# Patient Record
Sex: FEMALE | Race: WHITE | Employment: FULL TIME | URBAN - METROPOLITAN AREA
[De-identification: names, ages, dates, MRNs, and addresses within clinical notes are randomized per-mention and may not be internally consistent; named-entity substitution may affect disease eponyms.]

---

## 2017-06-12 RX ORDER — ALLOPURINOL 300 MG/1
TABLET ORAL
Qty: 90 TABLET | Refills: 0 | Status: SHIPPED | OUTPATIENT
Start: 2017-06-12 | End: 2017-09-14 | Stop reason: SDUPTHER

## 2017-06-16 RX ORDER — MELOXICAM 15 MG/1
15 TABLET ORAL DAILY
Qty: 90 TABLET | Refills: 1 | Status: SHIPPED | OUTPATIENT
Start: 2017-06-16 | End: 2017-06-19 | Stop reason: SDUPTHER

## 2017-06-19 RX ORDER — MELOXICAM 15 MG/1
15 TABLET ORAL DAILY
Qty: 90 TABLET | Refills: 3 | Status: SHIPPED | OUTPATIENT
Start: 2017-06-19 | End: 2017-09-14 | Stop reason: SDUPTHER

## 2017-09-14 ENCOUNTER — OFFICE VISIT (OUTPATIENT)
Dept: FAMILY MEDICINE CLINIC | Age: 62
End: 2017-09-14
Payer: COMMERCIAL

## 2017-09-14 VITALS
DIASTOLIC BLOOD PRESSURE: 80 MMHG | BODY MASS INDEX: 35.63 KG/M2 | SYSTOLIC BLOOD PRESSURE: 116 MMHG | HEIGHT: 67 IN | OXYGEN SATURATION: 97 % | HEART RATE: 72 BPM | WEIGHT: 227 LBS

## 2017-09-14 DIAGNOSIS — M10.9 GOUT, UNSPECIFIED CAUSE, UNSPECIFIED CHRONICITY, UNSPECIFIED SITE: Primary | ICD-10-CM

## 2017-09-14 DIAGNOSIS — Z23 IMMUNIZATION DUE: ICD-10-CM

## 2017-09-14 DIAGNOSIS — Z12.11 SCREEN FOR COLON CANCER: ICD-10-CM

## 2017-09-14 DIAGNOSIS — R60.9 EDEMA, UNSPECIFIED TYPE: ICD-10-CM

## 2017-09-14 PROCEDURE — G8427 DOCREV CUR MEDS BY ELIG CLIN: HCPCS | Performed by: FAMILY MEDICINE

## 2017-09-14 PROCEDURE — 90686 IIV4 VACC NO PRSV 0.5 ML IM: CPT | Performed by: FAMILY MEDICINE

## 2017-09-14 PROCEDURE — 3014F SCREEN MAMMO DOC REV: CPT | Performed by: FAMILY MEDICINE

## 2017-09-14 PROCEDURE — G8417 CALC BMI ABV UP PARAM F/U: HCPCS | Performed by: FAMILY MEDICINE

## 2017-09-14 PROCEDURE — 99213 OFFICE O/P EST LOW 20 MIN: CPT | Performed by: FAMILY MEDICINE

## 2017-09-14 PROCEDURE — 3017F COLORECTAL CA SCREEN DOC REV: CPT | Performed by: FAMILY MEDICINE

## 2017-09-14 PROCEDURE — 1036F TOBACCO NON-USER: CPT | Performed by: FAMILY MEDICINE

## 2017-09-14 PROCEDURE — 90471 IMMUNIZATION ADMIN: CPT | Performed by: FAMILY MEDICINE

## 2017-09-14 RX ORDER — MELOXICAM 15 MG/1
15 TABLET ORAL DAILY
Qty: 90 TABLET | Refills: 3 | Status: SHIPPED | OUTPATIENT
Start: 2017-09-14 | End: 2018-10-10 | Stop reason: SDUPTHER

## 2017-09-14 RX ORDER — ALLOPURINOL 300 MG/1
300 TABLET ORAL DAILY
Qty: 90 TABLET | Refills: 3 | Status: SHIPPED | OUTPATIENT
Start: 2017-09-14

## 2017-09-14 RX ORDER — ROSUVASTATIN CALCIUM 20 MG/1
20 TABLET, COATED ORAL DAILY
Qty: 90 TABLET | Refills: 3 | Status: SHIPPED | OUTPATIENT
Start: 2017-09-14 | End: 2017-10-09 | Stop reason: SDUPTHER

## 2017-09-14 RX ORDER — TRIAMTERENE AND HYDROCHLOROTHIAZIDE 75; 50 MG/1; MG/1
1 TABLET ORAL DAILY
Qty: 90 TABLET | Refills: 3 | Status: SHIPPED | OUTPATIENT
Start: 2017-09-14 | End: 2020-10-30

## 2017-09-14 ASSESSMENT — ENCOUNTER SYMPTOMS
ABDOMINAL DISTENTION: 0
COUGH: 0
EYE DISCHARGE: 0
SORE THROAT: 0
NAUSEA: 0
CONSTIPATION: 0
SHORTNESS OF BREATH: 0
EYE REDNESS: 0
WHEEZING: 0
EYE ITCHING: 0
SINUS PRESSURE: 0
PHOTOPHOBIA: 0
EYE PAIN: 0
BACK PAIN: 0
RHINORRHEA: 0
ABDOMINAL PAIN: 0
VOMITING: 0
CHEST TIGHTNESS: 0
BLOOD IN STOOL: 0
DIARRHEA: 0
COLOR CHANGE: 0
FACIAL SWELLING: 0
VOICE CHANGE: 0

## 2017-09-14 ASSESSMENT — PATIENT HEALTH QUESTIONNAIRE - PHQ9
SUM OF ALL RESPONSES TO PHQ9 QUESTIONS 1 & 2: 2
1. LITTLE INTEREST OR PLEASURE IN DOING THINGS: 1
2. FEELING DOWN, DEPRESSED OR HOPELESS: 1
SUM OF ALL RESPONSES TO PHQ QUESTIONS 1-9: 2

## 2017-10-09 RX ORDER — ROSUVASTATIN CALCIUM 20 MG/1
20 TABLET, COATED ORAL DAILY
Qty: 90 TABLET | Refills: 3 | Status: SHIPPED | OUTPATIENT
Start: 2017-10-09

## 2017-10-09 RX ORDER — POTASSIUM CHLORIDE 20 MEQ/1
40 TABLET, EXTENDED RELEASE ORAL DAILY
Qty: 180 TABLET | Refills: 3 | Status: SHIPPED | OUTPATIENT
Start: 2017-10-09

## 2018-10-10 RX ORDER — MELOXICAM 15 MG/1
TABLET ORAL
Qty: 90 TABLET | Refills: 3 | Status: SHIPPED | OUTPATIENT
Start: 2018-10-10

## 2018-11-30 DIAGNOSIS — M25.512 BILATERAL SHOULDER PAIN, UNSPECIFIED CHRONICITY: ICD-10-CM

## 2018-11-30 DIAGNOSIS — M25.511 BILATERAL SHOULDER PAIN, UNSPECIFIED CHRONICITY: ICD-10-CM

## 2018-12-03 ENCOUNTER — HOSPITAL ENCOUNTER (OUTPATIENT)
Dept: GENERAL RADIOLOGY | Facility: CLINIC | Age: 63
Discharge: HOME | End: 2018-12-05
Payer: COMMERCIAL

## 2018-12-03 ENCOUNTER — OFFICE VISIT (OUTPATIENT)
Dept: ORTHOPEDIC SURGERY | Age: 63
End: 2018-12-03
Payer: COMMERCIAL

## 2018-12-03 VITALS
BODY MASS INDEX: 34.53 KG/M2 | DIASTOLIC BLOOD PRESSURE: 92 MMHG | HEART RATE: 72 BPM | SYSTOLIC BLOOD PRESSURE: 151 MMHG | WEIGHT: 220 LBS | HEIGHT: 67 IN

## 2018-12-03 DIAGNOSIS — M25.512 BILATERAL SHOULDER PAIN, UNSPECIFIED CHRONICITY: ICD-10-CM

## 2018-12-03 DIAGNOSIS — Z96.611 HISTORY OF TOTAL REPLACEMENT OF BOTH SHOULDER JOINTS: Primary | ICD-10-CM

## 2018-12-03 DIAGNOSIS — Z96.612 HISTORY OF TOTAL REPLACEMENT OF BOTH SHOULDER JOINTS: Primary | ICD-10-CM

## 2018-12-03 DIAGNOSIS — M25.511 BILATERAL SHOULDER PAIN, UNSPECIFIED CHRONICITY: ICD-10-CM

## 2018-12-03 PROCEDURE — G8484 FLU IMMUNIZE NO ADMIN: HCPCS | Performed by: ORTHOPAEDIC SURGERY

## 2018-12-03 PROCEDURE — G8427 DOCREV CUR MEDS BY ELIG CLIN: HCPCS | Performed by: ORTHOPAEDIC SURGERY

## 2018-12-03 PROCEDURE — 73030 X-RAY EXAM OF SHOULDER: CPT

## 2018-12-03 PROCEDURE — 99213 OFFICE O/P EST LOW 20 MIN: CPT | Performed by: ORTHOPAEDIC SURGERY

## 2018-12-03 PROCEDURE — G8417 CALC BMI ABV UP PARAM F/U: HCPCS | Performed by: ORTHOPAEDIC SURGERY

## 2018-12-03 PROCEDURE — 1036F TOBACCO NON-USER: CPT | Performed by: ORTHOPAEDIC SURGERY

## 2018-12-03 PROCEDURE — 3017F COLORECTAL CA SCREEN DOC REV: CPT | Performed by: ORTHOPAEDIC SURGERY

## 2018-12-03 RX ORDER — CYCLOBENZAPRINE HCL 5 MG
5 TABLET ORAL PRN
COMMUNITY
Start: 2018-10-22

## 2018-12-03 RX ORDER — HYDROCHLOROTHIAZIDE 25 MG/1
25 TABLET ORAL
COMMUNITY
Start: 2018-10-22

## 2018-12-03 ASSESSMENT — ENCOUNTER SYMPTOMS
RESPIRATORY NEGATIVE: 1
EYES NEGATIVE: 1
GASTROINTESTINAL NEGATIVE: 1

## 2019-05-14 ENCOUNTER — OFFICE VISIT (OUTPATIENT)
Dept: ORTHOPEDIC SURGERY | Age: 64
End: 2019-05-14
Payer: COMMERCIAL

## 2019-05-14 VITALS
WEIGHT: 225 LBS | HEART RATE: 78 BPM | DIASTOLIC BLOOD PRESSURE: 83 MMHG | SYSTOLIC BLOOD PRESSURE: 144 MMHG | HEIGHT: 67 IN | BODY MASS INDEX: 35.31 KG/M2

## 2019-05-14 DIAGNOSIS — Z96.611 H/O TOTAL SHOULDER REPLACEMENT, RIGHT: ICD-10-CM

## 2019-05-14 DIAGNOSIS — S46.811A STRAIN OF RIGHT DELTOID MUSCLE, INITIAL ENCOUNTER: Primary | ICD-10-CM

## 2019-05-14 PROCEDURE — 99213 OFFICE O/P EST LOW 20 MIN: CPT | Performed by: PHYSICIAN ASSISTANT

## 2019-05-14 ASSESSMENT — ENCOUNTER SYMPTOMS
NAUSEA: 0
COUGH: 0
DIARRHEA: 0
APNEA: 0
ABDOMINAL PAIN: 0
ABDOMINAL DISTENTION: 0
VOMITING: 0
CONSTIPATION: 0
CHEST TIGHTNESS: 0
SHORTNESS OF BREATH: 0
COLOR CHANGE: 0

## 2019-05-14 NOTE — PROGRESS NOTES
problem, joint swelling and myalgias. Skin: Negative for color change and rash. Neurological: Negative for dizziness, weakness, numbness and headaches. Psychiatric/Behavioral: Negative for sleep disturbance.      Past Medical History:    Past Medical History:   Diagnosis Date    DVT, lower extremity ([address] more than 20 years ago    HPV (human papilloma virus) anogenital infection     Hyperlipidemia     Neuropathic pain of foot     dale feet    Osteoarthritis     PONV (postoperative nausea and vomiting)     Varicose vein of leg      Past Surgical History:    Past Surgical History:   Procedure Laterality Date    BACK SURGERY      BREAST BIOPSY Right     BREAST BIOPSY Right     has clip in    BUNIONECTOMY Bilateral     COLONOSCOPY N/A 6/20/14    DILATION AND CURETTAGE OF UTERUS      JOINT REPLACEMENT Bilateral 2011 and 2013    shoulders    LAMINECTOMY      l4-l5    OTHER SURGICAL HISTORY      laser of cervix and genital warts for HPV virus    SHOULDER SURGERY Bilateral     TONSILLECTOMY      TOTAL KNEE ARTHROPLASTY Bilateral 06/29/2016     Current Medications:   Current Outpatient Medications   Medication Sig Dispense Refill    cyclobenzaprine (FLEXERIL) 5 MG tablet Take 5 mg by mouth      hydrochlorothiazide (HYDRODIURIL) 25 MG tablet Take 25 mg by mouth      meloxicam (MOBIC) 15 MG tablet TAKE ONE TABLET BY MOUTH ONCE DAILY 90 tablet 3    rosuvastatin (CRESTOR) 20 MG tablet Take 1 tablet by mouth daily 90 tablet 3    potassium chloride (KLOR-CON M) 20 MEQ extended release tablet Take 2 tablets by mouth daily 180 tablet 3    allopurinol (ZYLOPRIM) 300 MG tablet Take 1 tablet by mouth daily 90 tablet 3    triamterene-hydrochlorothiazide (MAXZIDE) 75-50 MG per tablet Take 1 tablet by mouth daily 90 tablet 3    oxyCODONE-acetaminophen (PERCOCET) 5-325 MG per tablet 1- 2 q 8 hours prn pain 60 tablet 0    triamterene-hydrochlorothiazide (MAXZIDE-25) 37.5-25 MG per tablet Take 0.5 tablets by mouth daily 30 tablet 0    docusate sodium (COLACE, DULCOLAX) 100 MG CAPS Take 100 mg by mouth 2 times daily 60 capsule 0    DULoxetine (CYMBALTA) 60 MG capsule Take 1 capsule by mouth daily 90 capsule 3    Calcium-Magnesium-Vitamin D (CALCIUM 1200+D3 PO) Take 1,200 mg by mouth daily. No current facility-administered medications for this visit. Allergies:    Dicloxacillin and Pcn [penicillins]    Social History:   Social History     Socioeconomic History    Marital status:       Spouse name: None    Number of children: 2    Years of education: None    Highest education level: None   Occupational History     Employer: [employer]   Social Needs    Financial resource strain: None    Food insecurity:     Worry: None     Inability: None    Transportation needs:     Medical: None     Non-medical: None   Tobacco Use    Smoking status: Former Smoker     Years: 30.00     Last attempt to quit: 1999     Years since quittin.8    Smokeless tobacco: Never Used   Substance and Sexual Activity    Alcohol use: No    Drug use: No    Sexual activity: None   Lifestyle    Physical activity:     Days per week: None     Minutes per session: None    Stress: None   Relationships    Social connections:     Talks on phone: None     Gets together: None     Attends Muslim service: None     Active member of club or organization: None     Attends meetings of clubs or organizations: None     Relationship status: None    Intimate partner violence:     Fear of current or ex partner: None     Emotionally abused: None     Physically abused: None     Forced sexual activity: None   Other Topics Concern    None   Social History Narrative    None     Family History:  Family History   Problem Relation Age of Onset    No Known Problems Mother     No Known Problems Father      I have reviewed the CC, HPI, ROS, PMH, FHX, Social History, and if not present in this note, I have reviewed in the patient's chart. I agree with the documentation provided by other staff and have reviewed their documentation prior to providing my signature indicating agreement. Vitals:   BP (!) 144/83   Pulse 78   Ht 5' 6.5\" (1.689 m)   Wt 225 lb (102.1 kg)   BMI 35.77 kg/m²  Body mass index is 35.77 kg/m². Physical Examination:     Orthopedics:    GENERAL: Alert and oriented X3 in no acute distress. SKIN: Intact without lesions or ulcerations. NEURO: Musculoskeletal and axillary nerves intact to sensory and motor testing. VASC: Capillary refill is less than 3 seconds. Right Shoulder Exam    GEN:  Alert and oriented X 3, in no acute distress. SKIN:  Intact without rashes, lesions, or ulcerations. Incisions are well healed. NEURO:  Musculoskeletal ans axillary nerves intact to sensory and motor testing. VASC:  Cap refill less than than 3 secs. Negative Adson's test, Negative Narcisa's test.  ROM: 140 degrees of forward elevation, 40 degrees of external rotation in neutral, 90 degrees of external rotation in abduction, internal rotation to T10. STRENGTH: Supraspinatus 4+/5, external rotators 5/5. MUSC: No atrophy, negative subscap lift off or belly press test.  IMP: No painful arc, No pain with cross body abduction. PALP: No pain over anterolateral acromion, No pain over AC joint, No pain over traps/rhomboids. Anterior shoulder pain to palpation. INST: Mild Speed's test    Assessment:     1. Strain of right deltoid muscle, initial encounter    2. H/O total shoulder replacement, right        Procedures:    Procedure: no    Radiology:   X-ray from 12/03/18 was reviewed.    --------------------------------------------------  Xr Shoulder Right (min 2 Views)    Result Date: 5/15/2019  TOTAL SHOULDER X-RAY 2 views of the right shoulder and 2 views of the scapula, include AP, scapular Y, outlet and axillary views reveal a Depuy total shoulder arthroplasty in proper position with no subsidence, loosening or osteolysis present. Acromion is a type II. The acromioclavicular joint shows mild degenerative changes. Impression: right stable total shoulder arthroplasty with no complicating features. Plan:   Treatment : I reviewed the X-ray with the patient and I informed them that the shoulder looks good and there are no evident issues with the shoulder prosthetic. We discussed the etiologies and natural histories of s/p Right TSA. We discussed the various treatment alternatives including anti-inflammatory medications, physical therapy, injections, further imaging studies and as a last result surgery. During today's visit, I explained to the patient that I feel it would be advantageous for her to attend physical therapy to try and alleviate her shoulder pain because her ROM was good and so was her strength. In addition, I do not feel she is doing any damage using the shoulder so I instructed her not to worry and she may continue doing her ADL's with the shoulder. At this time, the patient has opted for a physical therapy script. A physical therapy prescription was given. Patient should return to the clinic in 6 weeks to follow up with Andreina Castorena PA-C, ATC if she is not better. The patient will call the office immediately with any problems. No orders of the defined types were placed in this encounter.     Orders Placed This Encounter   Procedures    XR SHOULDER RIGHT (MIN 2 VIEWS)     Standing Status:   Future     Number of Occurrences:   1     Standing Expiration Date:   5/14/2020    External Referral To Physical Therapy     Referral Priority:   Routine     Referral Type:   Eval and Treat     Referral Reason:   Specialty Services Required     Requested Specialty:   Physical Therapy     Number of Visits Requested:   1     Alex BULLOCK am scribing for and in the presence of  Talia Sainz ATC. 5/15/2019  10:23 PM    Andreina BULLOCK PA-C, ATC,  have personally seen this patient, reviewed the chart including history, and imaging if done. I personally  performed the physical exam and obtained any needed additional history. I placed orders, performed or supervised procedures and developed the treatment plan.     Electronically signed by Alva Smith PA-C, on 5/15/2019 at 10:24 PM      Electronically signed by Alva Smith PA-C, on 5/15/2019 at 10:23 PM

## 2019-05-31 ENCOUNTER — OFFICE VISIT (OUTPATIENT)
Dept: ORTHOPEDIC SURGERY | Age: 64
End: 2019-05-31
Payer: COMMERCIAL

## 2019-05-31 VITALS — WEIGHT: 225.09 LBS | BODY MASS INDEX: 36.17 KG/M2 | HEIGHT: 66 IN

## 2019-05-31 DIAGNOSIS — S92.351K CLOSED DISPLACED FRACTURE OF FIFTH METATARSAL BONE OF RIGHT FOOT WITH NONUNION, SUBSEQUENT ENCOUNTER: ICD-10-CM

## 2019-05-31 DIAGNOSIS — M21.861 GASTROCNEMIUS EQUINUS OF RIGHT LOWER EXTREMITY: ICD-10-CM

## 2019-05-31 DIAGNOSIS — M21.6X9 CAVUS DEFORMITY OF FOOT, ACQUIRED: Primary | ICD-10-CM

## 2019-05-31 PROCEDURE — G8427 DOCREV CUR MEDS BY ELIG CLIN: HCPCS | Performed by: ORTHOPAEDIC SURGERY

## 2019-05-31 PROCEDURE — 99203 OFFICE O/P NEW LOW 30 MIN: CPT | Performed by: ORTHOPAEDIC SURGERY

## 2019-05-31 PROCEDURE — 3017F COLORECTAL CA SCREEN DOC REV: CPT | Performed by: ORTHOPAEDIC SURGERY

## 2019-05-31 PROCEDURE — G8417 CALC BMI ABV UP PARAM F/U: HCPCS | Performed by: ORTHOPAEDIC SURGERY

## 2019-05-31 PROCEDURE — 1036F TOBACCO NON-USER: CPT | Performed by: ORTHOPAEDIC SURGERY

## 2019-05-31 ASSESSMENT — ENCOUNTER SYMPTOMS
EYE PAIN: 0
SHORTNESS OF BREATH: 0
ABDOMINAL PAIN: 0

## 2019-05-31 NOTE — PROGRESS NOTES
[facility]  [facility] [id number]  Dept: [phone]  Review of Systems    Name: Lina Stanley   : 1955    Date: 2019     Review of Systems   Constitutional: Negative for fever. HENT: Negative for tinnitus. Eyes: Negative for pain. Respiratory: Negative for shortness of breath. Cardiovascular: Negative for chest pain. Gastrointestinal: Negative for abdominal pain. Genitourinary: Negative for dysuria. Skin: Negative for rash. Neurological: Negative for headaches. Hematological: Does not bruise/bleed easily.      Musculoskeletal: See HPI for pertinent positives

## 2019-05-31 NOTE — PROGRESS NOTES
Hyperlipidemia     Neuropathic pain of foot     dale feet    Osteoarthritis     PONV (postoperative nausea and vomiting)     Varicose vein of leg        Past Surgical History:    Past Surgical History:   Procedure Laterality Date    BACK SURGERY      BREAST BIOPSY Right     BREAST BIOPSY Right     has clip in    BUNIONECTOMY Bilateral     COLONOSCOPY N/A 6/20/14    DILATION AND CURETTAGE OF UTERUS      JOINT REPLACEMENT Bilateral 2011 and 2013    shoulders    LAMINECTOMY      l4-l5    OTHER SURGICAL HISTORY      laser of cervix and genital warts for HPV virus    SHOULDER SURGERY Bilateral     TONSILLECTOMY      TOTAL KNEE ARTHROPLASTY Bilateral 06/29/2016       Current Medications:   Current Outpatient Medications   Medication Sig Dispense Refill    cyclobenzaprine (FLEXERIL) 5 MG tablet Take 5 mg by mouth      hydrochlorothiazide (HYDRODIURIL) 25 MG tablet Take 25 mg by mouth      meloxicam (MOBIC) 15 MG tablet TAKE ONE TABLET BY MOUTH ONCE DAILY 90 tablet 3    rosuvastatin (CRESTOR) 20 MG tablet Take 1 tablet by mouth daily 90 tablet 3    potassium chloride (KLOR-CON M) 20 MEQ extended release tablet Take 2 tablets by mouth daily 180 tablet 3    allopurinol (ZYLOPRIM) 300 MG tablet Take 1 tablet by mouth daily 90 tablet 3    triamterene-hydrochlorothiazide (MAXZIDE) 75-50 MG per tablet Take 1 tablet by mouth daily 90 tablet 3    oxyCODONE-acetaminophen (PERCOCET) 5-325 MG per tablet 1- 2 q 8 hours prn pain 60 tablet 0    triamterene-hydrochlorothiazide (MAXZIDE-25) 37.5-25 MG per tablet Take 0.5 tablets by mouth daily 30 tablet 0    docusate sodium (COLACE, DULCOLAX) 100 MG CAPS Take 100 mg by mouth 2 times daily 60 capsule 0    DULoxetine (CYMBALTA) 60 MG capsule Take 1 capsule by mouth daily 90 capsule 3    Calcium-Magnesium-Vitamin D (CALCIUM 1200+D3 PO) Take 1,200 mg by mouth daily. No current facility-administered medications for this visit.         Allergies:    Dicloxacillin and Pcn [penicillins]    Family History:  Family History   Problem Relation Age of Onset    No Known Problems Mother     No Known Problems Father        Social History:   Social History     Socioeconomic History    Marital status:       Spouse name: Not on file    Number of children: 2    Years of education: Not on file    Highest education level: Not on file   Occupational History     Employer: [employer] Financial resource strain: Not on file    Food insecurity:     Worry: Not on file     Inability: Not on file   [employer] needs:     Medical: Not on file     Non-medical: Not on file   Tobacco Use    Smoking status: Former Smoker     Years: 30.00     Last attempt to quit: 1999     Years since quittin.9    Smokeless tobacco: Never Used   Substance and Sexual Activity    Alcohol use: No    Drug use: No    Sexual activity: Not on file   Lifestyle    Physical activity:     Days per week: Not on file     Minutes per session: Not on file    Stress: Not on file   Relationships    Social connections:     Talks on phone: Not on file     Gets together: Not on file     Attends Jainism service: Not on file     Active member of club or organization: Not on file     Attends meetings of clubs or organizations: Not on file     Relationship status: Not on file    Intimate partner violence:     Fear of current or ex partner: Not on file     Emotionally abused: Not on file     Physically abused: Not on file     Forced sexual activity: Not on file   Other Topics Concern    Not on file   Social History Narrative    Not on file     She works as a  in a [employer]:  Ht 5' 6.5\" (1.689 m)   Wt 225 lb 1.4 oz (102.1 kg)   BMI 35.79 kg/m²    Physical Exam  Psych: alert and oriented to person, time, and place  Cardio:  well perfused extremities  Resp:  normal respiratory effort  Skin:  no cyanosis  Hem/lymph:  no lymphedema  Neuro: fracture/dislocation. Significant cavus deformity with nonunion of right fifth metatarsal.     Electronically signed by Kwadwo Alfaro MD on 5/31/2019 at 9:15 AM      ASSESSMENT AND PLAN:  Josefa Sagastume was seen today for New Patient (Right Foot)  The encounter diagnosis was Pain. Body mass index is 35.79 kg/m². She has right foot pain secondary to a chronic fifth metatarsal shaft fracture nonunion, with overlying callus, and the underlying significant cavus deformity with gastroc equinus. Notably, she has a history of gout, hypertension, hypercholesterolemia, chronic edema, and neuropathy of her bilateral lower extremities secondary to a back surgery approximately 20 years ago. I had a long discussion today with the patient about the likely diagnosis and its natural history, physical exam and imaging findings, as well as treatment options in detail. We discussed rest/activity modification, swelling control, NSAIDs/Acetaminophen/topical anesthetics, orthotics/shoewear modification, bracing/immobilization, injections, and physical therapy. Surgically, we discussed an exostectomy, versus a larger foot reconstruction with/without fixing the nonunion. We did discuss the relevant postoperative recovery periods, and she does express some concern over the larger reconstruction, in terms of the time off of work that it would require. The patient wishes to proceed with the recommendations as above. Orders/referrals were placed as below at today's visit. I referred the patient to physical therapy for gastroc stretching. I also provided a referral to orthotics to obtain a custom insert to offload the lateral border of her right foot, as well as custom shoe wear her to offload her bilateral fifth metatarsals and accommodate the shape of her foot. All questions were answered and the patient agrees with the above plan. The patient will return to clinic in 8 weeks with contralateral foot and ankle x-rays.       No follow-ups on file. No orders of the defined types were placed in this encounter. Orders Placed This Encounter   Procedures    XR ANKLE RIGHT (MIN 3 VIEWS)     WEIGHT BEARING 3 VIEWS:  AP, MORTISE, LATERAL  Please include entire foot     Standing Status:   Future     Number of Occurrences:   1     Standing Expiration Date:   5/31/2020     Order Specific Question:   Reason for exam:     Answer:   eval alignment         Debbie Habermann, MD  Orthopedic Surgery, Foot and Ankle        Please excuse any typos/errors, as this note was created with the assistance of voice recognition software. While intending to generate a document that actually reflects the content of the visit, the document can still have some errors including those of syntax and sound-a-like substitutions which may escape proof reading. In such instances, actual meaning can be extrapolated by context.

## 2019-06-02 ASSESSMENT — ENCOUNTER SYMPTOMS
EYE PAIN: 0
ABDOMINAL PAIN: 0
SHORTNESS OF BREATH: 0

## 2019-06-03 ENCOUNTER — HOSPITAL ENCOUNTER (OUTPATIENT)
Dept: PHYSICAL THERAPY | Facility: CLINIC | Age: 64
Setting detail: THERAPIES SERIES
Discharge: HOME | End: 2019-06-03
Payer: COMMERCIAL

## 2019-06-03 PROCEDURE — 97140 MANUAL THERAPY 1/> REGIONS: CPT

## 2019-06-03 PROCEDURE — 97110 THERAPEUTIC EXERCISES: CPT

## 2019-06-03 PROCEDURE — 97161 PT EVAL LOW COMPLEX 20 MIN: CPT

## 2019-06-03 NOTE — CONSULTS
[x] [facility]  Outpatient Rehabilitation &  Therapy  [facility]   [place]: [phone]  [place]: [phone]     Physical Therapy Lower Extremity Evaluation    Date:  6/3/2019  Patient: Shahida Flores  : 1955  MRN: [id number]  Physician: Dr Haas Edilson: [facility] (VERIFICATION PENDING)  Medical Diagnosis: RLE Gastroc equinus, cavus deformity, 5th MT fracture nonunion  Rehab Codes: M21.969  Onset date: 2019   Next Dr's appt.:     Subjective:   CC/HPI: Pt with lateral right foot pain that is is worse along of the midfoot/MTP jt. Pain is non-traumatic, has gotten worse since 2019. She has pain mostly with rubbing on the 5th MT from her shoes. Saw Dr Claudia Young, diagnosed with nonunion 5th MT and cavus deformity and recommended custom made shoe and orthotics to offload 5th MT and accommodate shape of her foot.       PMHx:  Bilat TKA (2 years ago), Bilat TSA (10 years ago)  Past Medical History:   Diagnosis Date    DVT, lower extremity ([address] more than 20 years ago    HPV (human papilloma virus) anogenital infection      Hyperlipidemia      Neuropathic pain of foot       dale feet    Osteoarthritis      PONV (postoperative nausea and vomiting)      Varicose vein of leg              Past Surgical History:    Past Surgical History         Past Surgical History:   Procedure Laterality Date    BACK SURGERY        BREAST BIOPSY Right      BREAST BIOPSY Right       has clip in    BUNIONECTOMY Bilateral      COLONOSCOPY N/A 14    DILATION AND CURETTAGE OF UTERUS        JOINT REPLACEMENT Bilateral  and      shoulders    LAMINECTOMY         l4-l5    OTHER SURGICAL HISTORY         laser of cervix and genital warts for HPV virus    SHOULDER SURGERY Bilateral      TONSILLECTOMY        TOTAL KNEE ARTHROPLASTY Bilateral 2016           Tests: [] X-Ray: 19 RLE 5th MT nonunion and significant cavus deformity   [] MRI:    [] Other:     Medications: [x] Refer to full medical record [] None [] Other:  Allergies:       [x] Refer to full medical record [] None [] Other:        Martial Status    Home type 1 story    Stairs from outside [address] [place] [address]   Job status    Work Activities/duties  Standing, walking checking parts, climbing and moving all over   Recreational Activities        Pain present? yes   Location RLE lateral foot, ankle into toes   Pain Rating currently 7/10   Pain at worse 10/10   Pain at best 3/10   Description of pain Sharp, shooting   Altered Sensation neuopathy in bilat feet (possibly from previous back surgery) R>L   What makes it worse Standing long term    What makes it better Rest, walking   Symptom progression same   Sleep              Objective:    ROM  ° A/P STRENGTH    Left Right Left Right   Hip Flex       Ext       ER       IR       ABD       ADD       Knee Flex       Ext       Ankle PF       DF  (knee straight) -10 -8 4- 4-   DF   (knee flexed) -5 2     Inv 30 30 4 3+   Ever -5 10 4- 4-   1st MTP DF       1st MTP PF                                                       Foot Posture Index FPI-6 -2 -1 0 +1 +2 Comments   Talar head Palpation [] [] [] [] [x]R/L bilat   Supra and infra lateral malleolar curvature  [] [] [] [x]L [x]R    Inversion/Eversion of calcaneus  [] [] [] [x]L  [x]R    Bulging in Talonavicular joint  [] [] [] [x]L/R []    Congruence of the medial longitudinal arch [] [] [] [] [x]L/R    Abduction/Adduction of the forefoot on the rearfoot [] [] []L [x]R/L []          FPI-6 Score:Left 5  Right 10   FPI-6 Scoring Scale:  -12 to -5 Highly Supinated  -1 to -4 Supinated  0 to +5 Neutral  +6 to +9 Pronated  +10 to +12 Highly Pronated      TESTS (+/-) Left Right Not Tested   Ant.  Drawer   []   Post. Drawer   []   Varus stress    []   Valgus Stress    []   Gastroc Equinus   []   Talor Tilt   []      []     Foot/Ankle Mobility  Left  Right    Talocrural Jt poor treatments)  1. Improve score on assessment tool from 65% impairment to less than 20% impairment   2. Reduce pain levels to 1-2/10 with ADLs                   Patient goals: reduce pain     Rehab Potential:  [x] Good  [] Fair  [] Poor   Suggested Professional Referral:  [x] No  [] Yes:  Barriers to Goal Achievement[de-identified]  [x] No  [] Yes:  Domestic Concerns:  [x] No  [] Yes:    Pt. Education:  [x] Plans/Goals, Risks/Benefits discussed  [x] Home exercise program    Method of Education: [x] Verbal  [x] Demo  [] Written  Comprehension of Education:  [x] Verbalizes understanding. [x] Demonstrates understanding. [x] Needs Review. [] Demonstrates/verbalizes understanding of HEP/Ed previously given. Treatment Plan:  [x] Therapeutic Exercise    [] Aquatic Therapy   [x] Manual Therapy     [] Electrical Stimulation  [x] Instruction in HEP      [] Lumbar/Cervical Traction  [x] Neuromuscular Re-education [] Cold/hotpack  [] Iontophoresis: 4 mg/mL  [x] Vasocompression (GameReady)                    Dexamethasone Sodium  [] Gait Training             Phosphate 40-80 mAmin         []  Medication allergies reviewed for use of    Dexamethasone Sodium Phosphate 4mg/ml     with iontophoresis treatments. Pt is not allergic.     Frequency:  2 x/week for 20 visits    Todays Treatment:    Exercises:  Exercise    RLE Gastroc Equinus, nonunion 5th MT fracture  Reps/ Time Weight/ Level Comments         Nustep            *Calf Inv belt S      *Calf towel S      *Foot dome      *Toe yoga            BAPS      Balance board      SLS      SB calf S                        Other:     Specific Instructions for next treatment: advance as tolerate     Evaluation Complexity:  History (Personal factors, comorbidities) [x] 0 [] 1-2 [] 3+   Exam (limitations, restrictions) [x] 1-2 [] 3 [] 4+   Clinical presentation (progression) [x] Stable [] Evolving  [] Unstable   Decision Making [x] Low [] Moderate [] High    [x] Low Complexity [] Moderate Complexity [] High Complexity       Treatment Charges: Mins Units   [x] Evaluation       []  Low       []  Moderate       []  High 30 1   []  Modalities     [x]  Ther Exercise 15 1   [x]  Manual Therapy 10 1   []  Ther Activities     []  Aquatics     []  Vasocompression     []  Other       TOTAL TREATMENT TIME: 60    Time in:1700   Time Out:1805    Electronically signed by: Ernst Arreola PT        Physician Signature:________________________________Date:__________________  By signing above or cosigning this note, I have reviewed this plan of care and certify a need for medically necessary rehabilitation services.      *PLEASE SIGN ABOVE AND FAX BACK ALL PAGES*

## 2019-06-05 ENCOUNTER — HOSPITAL ENCOUNTER (OUTPATIENT)
Dept: PHYSICAL THERAPY | Facility: CLINIC | Age: 64
Setting detail: THERAPIES SERIES
Discharge: HOME | End: 2019-06-05
Payer: COMMERCIAL

## 2019-06-05 PROCEDURE — 97110 THERAPEUTIC EXERCISES: CPT

## 2019-06-05 PROCEDURE — 97140 MANUAL THERAPY 1/> REGIONS: CPT

## 2019-06-05 NOTE — FLOWSHEET NOTE
[] [facility] &  Therapy  [address]  [phone]  F: [phone] [] [address]  [facility] [address]   [address]  P: [phone]  F: [phone] [] [address] [facility] &  Therapy  [address]  P: [phone]  F: [phone] [] [address]  [facility]   [address]  [place]: [phone]  F: [phone]     Physical Therapy Daily Treatment Note    Date:  2019  Patient Name:  Ruthann Baxter    :  1955  MRN: [id number]  Physician: Dr Emili Sosa: Medical [facility] (VERIFICATION PENDING)  Medical Diagnosis: RLE Gastroc equinus, cavus deformity, 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 :     Visit# / total visits:   Cancels/No Shows:     Subjective: Pt states she is not doing too bad today but it depends on what she does that increases her pain. Pt is compliant with HEP.     Pain:  [] Yes  [] No Location: RLE   Pain Rating: (0-10 scale) 0/10  Pain altered Tx:  [] No  [] Yes  Action:  Comments:    Objective:  Modalities:   Precautions:  Exercises:  Exercise     RLE Gastroc Equinus, nonunion 5th MT fracture  Reps/ Time Weight/ Level Comments               Nustep 10'     x              SB S 3x30\"   x   *Calf Inv belt S 3x30\"     x   *Calf towel S 3x30\"     x   *Foot dome x10     x   *Toe yoga x10     x              BAPS  x20  L2  CW/CCW/EV/IN/PF/DF x   Balance board  5'  L2   x   SLS 3x30\"        Rebounder x15  red ball                             Other: MFR R gastroc      Specific Instructions for next treatment: Advance as tolerate       Treatment Charges: Mins Units   []  Modalities     [x]  Ther Exercise 35 2   [x]  Manual Therapy 15 1   []  Ther Activities     []  Aquatics     []  Vasocompression     []  Other     Total Treatment time 50 3       Assessment: [] Progressing toward goals. [] No change. [] Other: Pt demos increase pronation with SLS, required cueing to activate peroneals with fair carryover. Applied MFR to R gastroc with sig tightness throughout muscle, minimal improvement in tension post. Pt notes \"feeling better\" post session, will continue to progress as able. STG/LTG  STG: (to be met in 10 treatments)  1. ? Pain: Decrease pain levels to 4/10   2. ? ROM: Increase flexibility and AROM limitations throughout to equal bilat to reduce difficulty with ADLs  3. ? Strength: Increase LE strength to 5/5 MMT   4. Independent with Home Exercise Programs     LTG: (to be met in 20 treatments)  1. Improve score on assessment tool from 65% impairment to less than 20% impairment   2. Reduce pain levels to 1-2/10 with ADLs                    Patient goals: reduce pain      Rehab Potential:  [x] Good  [] Fair  [] Poor   Suggested Professional Referral:  [x] No  [] Yes:  Barriers to Goal Achievement[de-identified]  [x] No  [] Yes:  Domestic Concerns:  [x] No  [] Yes:    Pt. Education:  [] Yes  [] No  [] Reviewed Prior HEP/Ed  Method of Education: [] Verbal  [] Demo  [] Written  Comprehension of Education:  [] Verbalizes understanding. [] Demonstrates understanding. [] Needs review. [] Demonstrates/verbalizes HEP/Ed previously given. Plan: [] Continue per plan of care.    [] Other:      Time In: 4:00pm  Time Out: 5:00pm    Electronically signed by:  Beatriz Villar PTA

## 2019-06-06 DIAGNOSIS — S92.351K CLOSED DISPLACED FRACTURE OF FIFTH METATARSAL BONE OF RIGHT FOOT WITH NONUNION, SUBSEQUENT ENCOUNTER: ICD-10-CM

## 2019-06-06 DIAGNOSIS — M21.6X9 CAVUS DEFORMITY OF FOOT, ACQUIRED: Primary | ICD-10-CM

## 2019-06-06 DIAGNOSIS — M21.861 GASTROCNEMIUS EQUINUS OF RIGHT LOWER EXTREMITY: ICD-10-CM

## 2019-06-11 ENCOUNTER — HOSPITAL ENCOUNTER (OUTPATIENT)
Dept: PHYSICAL THERAPY | Facility: CLINIC | Age: 64
Setting detail: THERAPIES SERIES
Discharge: HOME | End: 2019-06-11
Payer: COMMERCIAL

## 2019-06-11 PROCEDURE — 97110 THERAPEUTIC EXERCISES: CPT

## 2019-06-11 PROCEDURE — 97140 MANUAL THERAPY 1/> REGIONS: CPT

## 2019-06-11 NOTE — FLOWSHEET NOTE
Charges: Mins Units   []  Modalities     [x]  Ther Exercise 35 2   [x]  Manual Therapy 8 1   []  Ther Activities     []  Aquatics     []  Vasocompression     []  Other     Total Treatment time 43 3       Assessment: [] Progressing toward goals. [] No change. [] Other: Pt improves with technique this date, therefore increased difficulty with all exercises and added LE strengthening with good tolerance. Applied thera-cane to R gastroc with tension lateral>medial, decrease muscle tightness post application. Will continue to progress as tolerated next visit. STG/LTG  STG: (to be met in 10 treatments)  1. ? Pain: Decrease pain levels to 4/10   2. ? ROM: Increase flexibility and AROM limitations throughout to equal bilat to reduce difficulty with ADLs  3. ? Strength: Increase LE strength to 5/5 MMT   4. Independent with Home Exercise Programs     LTG: (to be met in 20 treatments)  1. Improve score on assessment tool from 65% impairment to less than 20% impairment   2. Reduce pain levels to 1-2/10 with ADLs                    Patient goals: reduce pain      Rehab Potential:  [x] Good  [] Fair  [] Poor   Suggested Professional Referral:  [x] No  [] Yes:  Barriers to Goal Achievement[de-identified]  [x] No  [] Yes:  Domestic Concerns:  [x] No  [] Yes:    Pt. Education:  [] Yes  [] No  [] Reviewed Prior HEP/Ed  Method of Education: [] Verbal  [] Demo  [] Written  Comprehension of Education:  [] Verbalizes understanding. [] Demonstrates understanding. [] Needs review. [] Demonstrates/verbalizes HEP/Ed previously given. Plan: [] Continue per plan of care.    [] Other:      Time In: 4:30pm  Time Out: 5:30pm    Electronically signed by:  Amandeep Denny PTA

## 2019-06-13 ENCOUNTER — HOSPITAL ENCOUNTER (OUTPATIENT)
Dept: PHYSICAL THERAPY | Facility: CLINIC | Age: 64
Setting detail: THERAPIES SERIES
Discharge: HOME | End: 2019-06-13
Payer: COMMERCIAL

## 2019-06-13 PROCEDURE — 97140 MANUAL THERAPY 1/> REGIONS: CPT

## 2019-06-13 PROCEDURE — 97110 THERAPEUTIC EXERCISES: CPT

## 2019-06-13 NOTE — FLOWSHEET NOTE
(to be met in 20 treatments)  1. Improve score on assessment tool from 65% impairment to less than 20% impairment   2. Reduce pain levels to 1-2/10 with ADLs       Patient goals: reduce pain      Rehab Potential:  [x] Good  [] Fair  [] Poor   Suggested Professional Referral:  [x] No  [] Yes:  Barriers to Goal Achievement[de-identified]  [x] No  [] Yes:  Domestic Concerns:  [x] No  [] Yes:    Pt. Education:  [x] Yes  [] No  [] Reviewed Prior HEP/Ed  Method of Education: [x] Verbal  [] Demo  [] Written  Comprehension of Education:  [x] Verbalizes understanding. [] Demonstrates understanding. [] Needs review. [] Demonstrates/verbalizes HEP/Ed previously given. Educated regarding benefits of donning compression stockings for inc tissue flexibility- verbalized understanding to all. Also rec exercise [place] as weather permits in order to reduce B LE swelling. Plan: [x] Continue per plan of care. [x] Other: Consider resuming TG interventions pending pt's symptoms; and 2\" heel-taps; mini-lunges next visit.        Time In: 4:30PM  Time Out: 5:30PM    Electronically signed by:  Kwame Garcia, PT

## 2019-06-17 ENCOUNTER — HOSPITAL ENCOUNTER (OUTPATIENT)
Dept: PHYSICAL THERAPY | Facility: CLINIC | Age: 64
Setting detail: THERAPIES SERIES
Discharge: HOME | End: 2019-06-17
Payer: COMMERCIAL

## 2019-06-17 PROCEDURE — 97140 MANUAL THERAPY 1/> REGIONS: CPT

## 2019-06-17 PROCEDURE — 97110 THERAPEUTIC EXERCISES: CPT

## 2019-06-17 NOTE — FLOWSHEET NOTE
[] [facility]) - [facility] &  Therapy  [address]  [phone]  F: [phone] [] [address]  [place] [address]   [address]  P: [phone]  F: [phone] [] [address] &  Therapy  [address] [facility]  P: [phone]  F: [phone] [x] [facility]  Outpatient Rehabilitation &  Therapy  [facility]   [address]  [place]: [phone]  F: [phone]     Physical Therapy Daily Treatment Note    Date:  2019  Patient Name:  Leidy Duran    :  1955  MRN: [id number]  Physician: Dr Barraza [facility]: [facility] [place] 20 visit approved co-pay $0  Medical Diagnosis: RLE Gastroc equinus, cavus deformity, 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     Pain:  [x] Yes  [] No Location: RLE   Pain Rating: (0-10 scale) 0/10  Pain altered Tx:  [x] No  [] Yes  Action: N/A  Comments:     Objective:  Modalities:   Precautions: OA  Exercises:  Exercise     RLE Gastroc Equinus, nonunion 5th MT fracture  Reps/ Time Weight/ Level Comments               Nustep 10' L3  x             SB S 3x30\"   x   Step S 3x30\"   x   *Calf Inv belt S 3x30\"    x   *Calf towel S 3x30\"     x              Balance board 5' L2  x   SLS 3x30\"  Willy fm  x   Rebounder x15 Yllw ball  Tandem stance 3-way x   TG Squats/HR x10  L19   x   TRX squats  2x10   x   Lunges x10 BOSU  x   Eccentric Step  x10   x   Heel-tap x10 2\"  --   Step down 2x10 4\"  x                     Other: MFR R Gastroc via Hypervolt       Specific Instructions for next treatment:          Treatment Charges: Mins Units   []  Modalities     [x]  Ther Exercise 35 2 [x]  Manual Therapy 15 1   []  Ther Activities     []  Aquatics     []  Vasocompression     []  Other     Total Treatment time 50 3         Assessment: [] Progressing toward goals. [] No change. [] Other: Progressed exercises per log this date, pt demos increase fatigue throughout, no increase in symptoms. Pt is unable to attend therapy x2 this week due to busy schedule, she plans on attending the following week. Educated pt on importance of HEP while she is not coming to therapy with good understanding. Applied Hyper-volt this date with decrease tension and increase mobility post. Will continue to progress as tolerated next visit. STG/LTG  STG: (to be met in 10 treatments)  1. ? Pain: Decrease pain levels to 4/10   2. ? ROM: Increase flexibility and AROM limitations throughout to equal bilat to reduce difficulty with ADLs  3. ? Strength: Increase LE strength to 5/5 MMT   4. Independent with Home Exercise Programs     LTG: (to be met in 20 treatments)  1. Improve score on assessment tool from 65% impairment to less than 20% impairment   2. Reduce pain levels to 1-2/10 with ADLs       Patient goals: reduce pain      Rehab Potential:  [x] Good  [] Fair  [] Poor   Suggested Professional Referral:  [x] No  [] Yes:  Barriers to Goal Achievement[de-identified]  [x] No  [] Yes:  Domestic Concerns:  [x] No  [] Yes:    Pt. Education:  [x] Yes  [] No  [] Reviewed Prior HEP/Ed  Method of Education: [x] Verbal  [] Demo  [] Written  Comprehension of Education:  [x] Verbalizes understanding. [] Demonstrates understanding. [] Needs review. [] Demonstrates/verbalizes HEP/Ed previously given. Educated regarding benefits of donning compression stockings for inc tissue flexibility- verbalized understanding to all. Also rec exercise [place] as weather permits in order to reduce B LE swelling. Plan: [x] Continue per plan of care.    [x] Other: Consider resuming TG interventions pending pt's symptoms; and 2\" heel-taps; mini-lunges next visit.        Time In: 4:30PM  Time Out: 5:30PM    Electronically signed by:  Tauna Favre, PTA

## 2019-06-19 ENCOUNTER — APPOINTMENT (OUTPATIENT)
Dept: PHYSICAL THERAPY | Facility: CLINIC | Age: 64
End: 2019-06-19
Payer: COMMERCIAL

## 2019-06-24 ENCOUNTER — HOSPITAL ENCOUNTER (OUTPATIENT)
Dept: PHYSICAL THERAPY | Facility: CLINIC | Age: 64
Setting detail: THERAPIES SERIES
Discharge: HOME | End: 2019-06-24
Payer: COMMERCIAL

## 2019-06-24 PROCEDURE — 97140 MANUAL THERAPY 1/> REGIONS: CPT

## 2019-06-24 PROCEDURE — 97110 THERAPEUTIC EXERCISES: CPT

## 2019-06-24 NOTE — FLOWSHEET NOTE
[] [facility]) [facility] &  Therapy  [address]  [phone]  F: [phone] [] [address]  [place] [address]   [address]  P: [phone]  F: [phone] [] [address] &  Therapy  [address] [facility]  P: [phone]  F: [phone] [x] [facility]  Outpatient Rehabilitation &  Therapy  [facility]   [address]  [place]: [phone]  F: [phone]     Physical Therapy Daily Treatment Note    Date:  2019  Patient Name:  Suzy Zimmer    :  1955  MRN: [id number]  Physician: Dr Alis Dang: Flavia Dhaliwal 20 visit approved co-pay $0  Medical Diagnosis: RLE Gastroc equinus, cavus deformity, 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     Pain:  [x] Yes  [] No Location: RLE   Pain Rating: (0-10 scale) 0/10  Pain altered Tx:  [x] No  [] Yes  Action: N/A  Comments:     Objective:  Modalities:   Precautions: OA  Exercises:  Exercise     RLE Gastroc Equinus, nonunion 5th MT fracture  Reps/ Time Weight/ Level Comments               Nustep 10' L3  x             SB S 3x30\"   x   Step S 3x30\"   x   *Calf Inv belt S 3x30\"    x   *Calf towel S 3x30\"     --              Balance board 5' L2  x   SLS 3x30\"  Willy fm  x   Rebounder 2x10 Yllw ball  Tandem stance grn fm x   TG Squats/HR 2x10  L20   x   TRX squats  2x10   x   Lunges x15 BOSU  x   Eccentric Step  x10  Up with 2 down with 1 x   Heel-tap x10 2\"  x   Step down x10 4\"  x                     Other: MFR R Gastroc via Hypervolt       Specific Instructions for next treatment:          Treatment Charges: Mins Units   []  Modalities     [x]  Ther Exercise 35 2   [x]  Manual Therapy 15 1   []  Ther Activities     []  Aquatics     []  Vasocompression     []  Other     Total Treatment time 50 3         Assessment: [] Progressing toward goals. [] No change. [] Other: Minimal progressions made this date due to absence the last week. Pt tolerates all exercises with no pain requiring no correction for form. Discussed last visit today, however, pt prefers chart to remain open for the next two weeks in case a change in symptoms occur and she can resume therapy. Educated pt on importance of stretches at home to decrease muscle tension with good understanding. Pt plans to see Dr. Aditi Solis 6/31/19 and plans to receive her orthotic on July 17th. Advised pt to schedule a manual appointment if she feels tight in bilateral gastrocs. Will keep chart open for the next 2 weeks, if pt has not scheduled an appointment within this time we will discharge chart. STG/LTG  STG: (to be met in 10 treatments)  1. ? Pain: Decrease pain levels to 4/10   2. ? ROM: Increase flexibility and AROM limitations throughout to equal bilat to reduce difficulty with ADLs  3. ? Strength: Increase LE strength to 5/5 MMT   4. Independent with Home Exercise Programs     LTG: (to be met in 20 treatments)  1. Improve score on assessment tool from 65% impairment to less than 20% impairment   2. Reduce pain levels to 1-2/10 with ADLs       Patient goals: reduce pain      Rehab Potential:  [x] Good  [] Fair  [] Poor   Suggested Professional Referral:  [x] No  [] Yes:  Barriers to Goal Achievement[de-identified]  [x] No  [] Yes:  Domestic Concerns:  [x] No  [] Yes:    Pt. Education:  [x] Yes  [] No  [] Reviewed Prior HEP/Ed  Method of Education: [x] Verbal  [] Demo  [] Written  Comprehension of Education:  [x] Verbalizes understanding. [] Demonstrates understanding. [] Needs review. [] Demonstrates/verbalizes HEP/Ed previously given.   Educated regarding benefits of donning compression stockings for inc tissue flexibility- verbalized understanding to all. Also rec exercise in pool at camp [place] as weather permits in order to reduce B LE swelling. Plan: [x] Continue per plan of care. [x] Other: Consider resuming TG interventions pending pt's symptoms; and 2\" heel-taps; mini-lunges next visit.        Time In: 4:30PM  Time Out: 5:30PM    Electronically signed by:  Yareli Rodriguez PTA

## 2019-06-27 ENCOUNTER — APPOINTMENT (OUTPATIENT)
Dept: PHYSICAL THERAPY | Facility: CLINIC | Age: 64
End: 2019-06-27
Payer: COMMERCIAL

## 2019-07-31 ENCOUNTER — OFFICE VISIT (OUTPATIENT)
Dept: ORTHOPEDIC SURGERY | Age: 64
End: 2019-07-31
Payer: COMMERCIAL

## 2019-07-31 VITALS
WEIGHT: 225.09 LBS | BODY MASS INDEX: 36.17 KG/M2 | HEIGHT: 66 IN | DIASTOLIC BLOOD PRESSURE: 88 MMHG | SYSTOLIC BLOOD PRESSURE: 140 MMHG | HEART RATE: 77 BPM

## 2019-07-31 DIAGNOSIS — M20.22 HALLUX RIGIDUS OF BOTH FEET: ICD-10-CM

## 2019-07-31 DIAGNOSIS — M20.11 VALGUS DEFORMITY OF BOTH GREAT TOES: ICD-10-CM

## 2019-07-31 DIAGNOSIS — M21.6X9 CAVUS DEFORMITY OF FOOT, ACQUIRED: Primary | ICD-10-CM

## 2019-07-31 DIAGNOSIS — M20.21 HALLUX RIGIDUS OF BOTH FEET: ICD-10-CM

## 2019-07-31 DIAGNOSIS — S92.351K CLOSED DISPLACED FRACTURE OF FIFTH METATARSAL BONE OF RIGHT FOOT WITH NONUNION, SUBSEQUENT ENCOUNTER: ICD-10-CM

## 2019-07-31 DIAGNOSIS — M20.12 VALGUS DEFORMITY OF BOTH GREAT TOES: ICD-10-CM

## 2019-07-31 DIAGNOSIS — M21.861 GASTROCNEMIUS EQUINUS OF RIGHT LOWER EXTREMITY: ICD-10-CM

## 2019-07-31 PROCEDURE — 99213 OFFICE O/P EST LOW 20 MIN: CPT | Performed by: ORTHOPAEDIC SURGERY

## 2019-07-31 PROCEDURE — 3017F COLORECTAL CA SCREEN DOC REV: CPT | Performed by: ORTHOPAEDIC SURGERY

## 2019-07-31 PROCEDURE — 1036F TOBACCO NON-USER: CPT | Performed by: ORTHOPAEDIC SURGERY

## 2019-07-31 PROCEDURE — G8417 CALC BMI ABV UP PARAM F/U: HCPCS | Performed by: ORTHOPAEDIC SURGERY

## 2019-07-31 PROCEDURE — G8427 DOCREV CUR MEDS BY ELIG CLIN: HCPCS | Performed by: ORTHOPAEDIC SURGERY

## 2019-07-31 ASSESSMENT — ENCOUNTER SYMPTOMS
ABDOMINAL PAIN: 0
EYE PAIN: 0
SHORTNESS OF BREATH: 0

## 2019-07-31 NOTE — PROGRESS NOTES
[facility] AND SPORTS MEDICINE  [facility]  [address] [place] [address]  Dept: [phone]    Ambulatory Orthopedic Consult      CHIEF COMPLAINT:    Chief Complaint   Patient presents with    Follow-up     RT foot fx and LT ankle/foot pain       HISTORY OF PRESENT ILLNESS:      The patient is a 60 y.o. female who is being seen for consultation and evaluation of pain at the lateral border of the right midfoot greater than her medial aspect of the first MTP joint, and some mild left forefoot/midfoot lateral border pain, which began atraumatically many years ago, but reports it is been getting worse since January 2019. The pain is described mainly with mechanical terms (dull/sharp/throbbing). The pain is worse with activity and better with rest. The patient reports a progressive course. The patient has tried:      [x]  rest/activity modification          [x]  NSAIDs      []  opiates      []  orthotics        [x]  change in shoes    []  home exercises  []  physical therapy      []  CAM boot     []  brace:    []  injection:       []  surgery:      She has seen a podiatrist in the past, who pared down her callus on the right foot lateral border. She is also been modifying her shoes by cutting holes to accommodate her deformity. INTERVAL HISTORY 7/31/2019:  She is seen again today in the office for follow up, having been seen here last about 2 months ago on 5/31/2019. Since being seen last, she reports the problem has improved slightly. She just recently obtained her custom offloading orthotics, and has been fitted for but not received her custom shoes. She finished her physical therapy about a month ago, and has been doing home exercise protocol, although she reports she has not been doing it as often as she should be, but tries to stretch a couple times a week. She denies any other significant changes.       REVIEW OF SYSTEMS:  Review of Systems Ankle:      []  Subtalar:  Diminished      [x]  1st MTP:        []  1st TMT:            []  Range of motion not tested due to pain    Tenderness to Palpation:    Tenderness to palpation:  Distal fifth metatarsal  -Hallux valgus      RADIOLOGY:   7/31/2019 FINDINGS:  Three weightbearing views (AP, Mortise, and Lateral) of the left ankle and three weightbearing views (AP, Oblique, Lateral) of the left foot were obtained in the office today and reviewed, revealing no acute fracture, dislocation, or radioopaque foreign body/tumor. The ankle mortise is maintained with no widening of the clear spaces. Narrowed talocalcaneal angle. Meary's angle is apex dorsal. Increased calcaneal pitch. Significant cavus deformity. Hallux valgus with degenerative changes of the first MTP joint including joint narrowing, sclerosis, osteophytes. Midfoot significantly abducted with compensatory forefoot adduction. IMPRESSION: Significant cavus deformity with hallux valgus with degenerative changes diffusely throughout the midfoot, as well as the first MTP joint. Electronically signed by Greta Queen MD    FINDINGS:  Three weightbearing views (AP, Mortise, and Lateral) of the ankle and three weightbearing views (AP, Oblique, Lateral) of the foot were obtained in the office today and reviewed, revealing no chronic nonunion of the right fifth metatarsal shaft. Significant cavus deformity. Hallux valgus. Midfoot significantly abducted with compensatory forefoot adduction. IMPRESSION:  No acute fracture/dislocation. Significant cavus deformity with nonunion of right fifth metatarsal.     Electronically signed by Greta Queen MD     ASSESSMENT AND PLAN:  Lorella Gilford was seen today for Follow-up (RT foot fx and LT ankle/foot pain)  The primary encounter diagnosis was Cavus deformity of foot, acquired.  Diagnoses of Closed displaced fracture of fifth metatarsal bone of right foot with nonunion, subsequent encounter, Gastrocnemius

## 2019-08-14 ENCOUNTER — OFFICE VISIT (OUTPATIENT)
Dept: ORTHOPEDIC SURGERY | Age: 64
End: 2019-08-14
Payer: COMMERCIAL

## 2019-08-14 VITALS
HEART RATE: 84 BPM | DIASTOLIC BLOOD PRESSURE: 91 MMHG | WEIGHT: 224 LBS | BODY MASS INDEX: 35.16 KG/M2 | SYSTOLIC BLOOD PRESSURE: 148 MMHG | HEIGHT: 67 IN

## 2019-08-14 DIAGNOSIS — M70.51 PES ANSERINUS BURSITIS OF RIGHT KNEE: Primary | ICD-10-CM

## 2019-08-14 DIAGNOSIS — Z96.652 S/P TOTAL KNEE ARTHROPLASTY, LEFT: ICD-10-CM

## 2019-08-14 DIAGNOSIS — Z96.651 S/P TOTAL KNEE ARTHROPLASTY, RIGHT: ICD-10-CM

## 2019-08-14 PROCEDURE — 99213 OFFICE O/P EST LOW 20 MIN: CPT | Performed by: PHYSICIAN ASSISTANT

## 2019-08-14 PROCEDURE — 20611 DRAIN/INJ JOINT/BURSA W/US: CPT | Performed by: PHYSICIAN ASSISTANT

## 2019-08-14 RX ORDER — METHYLPREDNISOLONE ACETATE 40 MG/ML
40 INJECTION, SUSPENSION INTRA-ARTICULAR; INTRALESIONAL; INTRAMUSCULAR; SOFT TISSUE ONCE
Status: COMPLETED | OUTPATIENT
Start: 2019-08-14 | End: 2019-08-14

## 2019-08-14 RX ORDER — LIDOCAINE HYDROCHLORIDE 10 MG/ML
4 INJECTION, SOLUTION INFILTRATION; PERINEURAL ONCE
Status: COMPLETED | OUTPATIENT
Start: 2019-08-14 | End: 2019-08-14

## 2019-08-14 RX ADMIN — LIDOCAINE HYDROCHLORIDE 4 ML: 10 INJECTION, SOLUTION INFILTRATION; PERINEURAL at 14:52

## 2019-08-14 RX ADMIN — METHYLPREDNISOLONE ACETATE 40 MG: 40 INJECTION, SUSPENSION INTRA-ARTICULAR; INTRALESIONAL; INTRAMUSCULAR; SOFT TISSUE at 14:53

## 2019-08-14 ASSESSMENT — ENCOUNTER SYMPTOMS
COLOR CHANGE: 0
ABDOMINAL PAIN: 0
APNEA: 0
CHEST TIGHTNESS: 0
SHORTNESS OF BREATH: 0
COUGH: 0
ABDOMINAL DISTENTION: 0
VOMITING: 0
NAUSEA: 0
CONSTIPATION: 0
DIARRHEA: 0

## 2019-08-14 NOTE — PROGRESS NOTES
for dizziness, weakness, numbness and headaches. Psychiatric/Behavioral: Negative for sleep disturbance.      Past Medical History:    Past Medical History:   Diagnosis Date    DVT, lower extremity ([address] more than 20 years ago    HPV (human papilloma virus) anogenital infection     Hyperlipidemia     Neuropathic pain of foot     dale feet    Osteoarthritis     PONV (postoperative nausea and vomiting)     Varicose vein of leg      Past Surgical History:    Past Surgical History:   Procedure Laterality Date    BACK SURGERY      BREAST BIOPSY Right     BREAST BIOPSY Right     has clip in    BUNIONECTOMY Bilateral     COLONOSCOPY N/A 6/20/14    DILATION AND CURETTAGE OF UTERUS      JOINT REPLACEMENT Bilateral 2011 and 2013    shoulders    LAMINECTOMY      l4-l5    OTHER SURGICAL HISTORY      laser of cervix and genital warts for HPV virus    SHOULDER SURGERY Bilateral     TONSILLECTOMY      TOTAL KNEE ARTHROPLASTY Bilateral 06/29/2016     Current Medications:   Current Outpatient Medications   Medication Sig Dispense Refill    cyclobenzaprine (FLEXERIL) 5 MG tablet Take 5 mg by mouth      hydrochlorothiazide (HYDRODIURIL) 25 MG tablet Take 25 mg by mouth      meloxicam (MOBIC) 15 MG tablet TAKE ONE TABLET BY MOUTH ONCE DAILY 90 tablet 3    rosuvastatin (CRESTOR) 20 MG tablet Take 1 tablet by mouth daily 90 tablet 3    potassium chloride (KLOR-CON M) 20 MEQ extended release tablet Take 2 tablets by mouth daily 180 tablet 3    allopurinol (ZYLOPRIM) 300 MG tablet Take 1 tablet by mouth daily 90 tablet 3    triamterene-hydrochlorothiazide (MAXZIDE) 75-50 MG per tablet Take 1 tablet by mouth daily 90 tablet 3    oxyCODONE-acetaminophen (PERCOCET) 5-325 MG per tablet 1- 2 q 8 hours prn pain 60 tablet 0    triamterene-hydrochlorothiazide (MAXZIDE-25) 37.5-25 MG per tablet Take 0.5 tablets by mouth daily 30 tablet 0    docusate sodium (COLACE, DULCOLAX) 100 MG CAPS Take 100 mg by mouth 2 times

## 2020-06-30 ENCOUNTER — OFFICE VISIT (OUTPATIENT)
Dept: ORTHOPEDIC SURGERY | Age: 65
End: 2020-06-30
Payer: COMMERCIAL

## 2020-06-30 VITALS
BODY MASS INDEX: 36.96 KG/M2 | HEART RATE: 74 BPM | HEIGHT: 66 IN | DIASTOLIC BLOOD PRESSURE: 83 MMHG | TEMPERATURE: 97.2 F | SYSTOLIC BLOOD PRESSURE: 145 MMHG | WEIGHT: 230 LBS

## 2020-06-30 PROCEDURE — 20611 DRAIN/INJ JOINT/BURSA W/US: CPT | Performed by: PHYSICIAN ASSISTANT

## 2020-06-30 NOTE — PROGRESS NOTES
[facility] AND SPORTS MEDICINE  [address] [place] [id number]  Dept: [phone]  Dept Fax: [phone]          Bilateral Knee Visit - Follow up    Subjective:     Chief Complaint   Patient presents with    Knee Pain     bilateral knee pain, Hx of Bilateral TKAs 7/2/16, requesting inbjections in both knees, R>L     HPI:     Bertin Carias presents today for pes anserine bursa pain in the Bilateral knee. Patient is here today for cortisone injections into the pes anserine bursa of the bilateral knee. Her last cortisone injection was on 08/14/2019 with excellent pain relief for 10 months. She has not had a lubrication injection. I have reviewed the CC, and if not present in this note, I have reviewed in the patient's chart. I agree with the documentation provided by other staff and have reviewed their documentation prior to providing my signature indicating agreement. Vitals:   BP (!) 145/83   Pulse 74   Temp 97.2 °F (36.2 °C)   Ht 5' 6.5\" (1.689 m)   Wt 230 lb (104.3 kg)   BMI 36.57 kg/m²  Body mass index is 36.57 kg/m². Assessment:     1. Pes anserinus bursitis of right knee    2. Pes anserinus bursitis of left knee      Procedures:    Procedure: Yes    Pes Anserine Bursa Knee Injection    Location: Bilateral Knee  Procedure: Corticosteroid injection into the pes anserine bursa of the knee. The patient was placed in the Supine position on the exam table. The most tender point of the bursa was identified and marked. The skin was prepped with betadine in a sterile fashion. Utilizing ultrasound for precise placement and clean technique with sterile gloves a 5cc solution containing 4cc of 1.0% Lidocaine with 1cc containing 40mg of Depo-medrol was injected. There was no resistance to the injection. The wound was cleansed and a band-aid was placed. the patient tolerated the procedure without difficulty.  Adverse reactions to the injection were discussed with the patient including signs of infection (increasing pain, redness, swelling) and the patient was instructed to call immediately if experiencing any of these symptoms. Plan:   Patient should return to the clinic in 3 months to follow up with Aaliyah Cavazos PA-C for another set of cortisone injections if needed. The patient will call the office immediately with any problems. Orders Placed This Encounter   Medications    lidocaine 1 % injection 4 mL    methylPREDNISolone acetate (DEPO-MEDROL) injection 40 mg    lidocaine 1 % injection 4 mL    methylPREDNISolone acetate (DEPO-MEDROL) injection 40 mg     Orders Placed This Encounter   Procedures    AZ ARTHROCENTESIS ASPIR&/INJ MAJOR JT/BURSA W/US     IAlejandro V, am scribing for and in the presence of  Aaliyah Cavazos PA-C. 7/3/2020  10:41 AM    I, Aaliyah Cavazos PA-C, have personally seen this patient, reviewed the chart including history, and imaging if done. I personally  performed the physical exam and obtained any needed additional history. I placed orders, performed or supervised procedures and developed the treatment plan.     Electronically signed by Amarilys Orta PA-C, on 7/3/2020 at 10:41 AM      Electronically signed by Amarilys Orta PA-C, on 7/3/2020 at 10:41 AM

## 2020-07-03 RX ORDER — LIDOCAINE HYDROCHLORIDE 10 MG/ML
4 INJECTION, SOLUTION INFILTRATION; PERINEURAL ONCE
Status: COMPLETED | OUTPATIENT
Start: 2020-07-03 | End: 2020-07-08

## 2020-07-03 RX ORDER — METHYLPREDNISOLONE ACETATE 40 MG/ML
40 INJECTION, SUSPENSION INTRA-ARTICULAR; INTRALESIONAL; INTRAMUSCULAR; SOFT TISSUE ONCE
Status: COMPLETED | OUTPATIENT
Start: 2020-07-03 | End: 2020-07-08

## 2020-07-08 RX ADMIN — METHYLPREDNISOLONE ACETATE 40 MG: 40 INJECTION, SUSPENSION INTRA-ARTICULAR; INTRALESIONAL; INTRAMUSCULAR; SOFT TISSUE at 08:33

## 2020-07-08 RX ADMIN — LIDOCAINE HYDROCHLORIDE 4 ML: 10 INJECTION, SOLUTION INFILTRATION; PERINEURAL at 08:33

## 2020-07-08 RX ADMIN — LIDOCAINE HYDROCHLORIDE 4 ML: 10 INJECTION, SOLUTION INFILTRATION; PERINEURAL at 08:34

## 2020-07-08 RX ADMIN — METHYLPREDNISOLONE ACETATE 40 MG: 40 INJECTION, SUSPENSION INTRA-ARTICULAR; INTRALESIONAL; INTRAMUSCULAR; SOFT TISSUE at 08:34

## 2020-10-30 ENCOUNTER — OFFICE VISIT (OUTPATIENT)
Dept: ORTHOPEDIC SURGERY | Age: 65
End: 2020-10-30
Payer: COMMERCIAL

## 2020-10-30 VITALS
WEIGHT: 230 LBS | HEART RATE: 79 BPM | SYSTOLIC BLOOD PRESSURE: 156 MMHG | HEIGHT: 67 IN | TEMPERATURE: 97.2 F | BODY MASS INDEX: 36.1 KG/M2 | DIASTOLIC BLOOD PRESSURE: 98 MMHG

## 2020-10-30 PROCEDURE — G8427 DOCREV CUR MEDS BY ELIG CLIN: HCPCS | Performed by: PHYSICIAN ASSISTANT

## 2020-10-30 PROCEDURE — G8417 CALC BMI ABV UP PARAM F/U: HCPCS | Performed by: PHYSICIAN ASSISTANT

## 2020-10-30 PROCEDURE — 3017F COLORECTAL CA SCREEN DOC REV: CPT | Performed by: PHYSICIAN ASSISTANT

## 2020-10-30 PROCEDURE — 20611 DRAIN/INJ JOINT/BURSA W/US: CPT | Performed by: PHYSICIAN ASSISTANT

## 2020-10-30 PROCEDURE — 99213 OFFICE O/P EST LOW 20 MIN: CPT | Performed by: PHYSICIAN ASSISTANT

## 2020-10-30 PROCEDURE — 1036F TOBACCO NON-USER: CPT | Performed by: PHYSICIAN ASSISTANT

## 2020-10-30 PROCEDURE — G8484 FLU IMMUNIZE NO ADMIN: HCPCS | Performed by: PHYSICIAN ASSISTANT

## 2020-10-30 RX ORDER — METHYLPREDNISOLONE ACETATE 40 MG/ML
40 INJECTION, SUSPENSION INTRA-ARTICULAR; INTRALESIONAL; INTRAMUSCULAR; SOFT TISSUE ONCE
Status: COMPLETED | OUTPATIENT
Start: 2020-10-30 | End: 2020-10-30

## 2020-10-30 RX ORDER — LIDOCAINE HYDROCHLORIDE 10 MG/ML
8 INJECTION, SOLUTION INFILTRATION; PERINEURAL ONCE
Status: COMPLETED | OUTPATIENT
Start: 2020-10-30 | End: 2020-10-30

## 2020-10-30 RX ADMIN — LIDOCAINE HYDROCHLORIDE 8 ML: 10 INJECTION, SOLUTION INFILTRATION; PERINEURAL at 11:15

## 2020-10-30 RX ADMIN — METHYLPREDNISOLONE ACETATE 40 MG: 40 INJECTION, SUSPENSION INTRA-ARTICULAR; INTRALESIONAL; INTRAMUSCULAR; SOFT TISSUE at 11:15

## 2020-10-30 ASSESSMENT — ENCOUNTER SYMPTOMS
CHEST TIGHTNESS: 0
VOMITING: 0
ABDOMINAL PAIN: 0
SHORTNESS OF BREATH: 0
APNEA: 0
NAUSEA: 0
CONSTIPATION: 0
COUGH: 0
DIARRHEA: 0
ABDOMINAL DISTENTION: 0
COLOR CHANGE: 0

## 2020-10-30 NOTE — PROGRESS NOTES
[address] [facility]  [address] [place] [address]  Dept: [phone]  Dept Fax: [phone]        Post Operative Follow Up    Subjective:     Chief Complaint   Patient presents with    Knee Pain     bilateral knee pain. would like injections. last cortisone on 6/30/2020 pes anserine. Post Op Surgery:     The patient is here for a follow up after having bilateral total knee replacements. The date of surgery was on 06/29/2016. Therefore we are 4 years and 4 months post op. Currently the patient's pain is controlled with nothing. Physical therapy was  completed. Patient presents today with no ambulatory devices. Patient is here today for bilateral pes anserine bursa injections. Her last set of cortisone injections was done on 06/30/2020 into the pes anserine bursa and she had excellent pain relief. Review of Systems   Constitutional: Positive for activity change. Negative for appetite change, fatigue and fever. Respiratory: Negative for apnea, cough, chest tightness and shortness of breath. Cardiovascular: Negative for chest pain, palpitations and leg swelling. Gastrointestinal: Negative for abdominal distention, abdominal pain, constipation, diarrhea, nausea and vomiting. Genitourinary: Negative for difficulty urinating, dysuria and hematuria. Musculoskeletal: Positive for arthralgias. Negative for gait problem, joint swelling and myalgias. Skin: Negative for color change and rash. Neurological: Negative for dizziness, weakness, numbness and headaches. Psychiatric/Behavioral: Negative for sleep disturbance. I have reviewed the CC, HPI, ROS, PMH, FHX, Social History, and if not present in this note, I have reviewed in the patient's chart. I agree with the documentation provided by other staff and have reviewed their documentation prior to providing my signature indicating agreement.   Vitals:   BP (!) 156/98 Pulse 79   Temp 97.2 °F (36.2 °C)   Ht 5' 6.5\" (1.689 m)   Wt 230 lb (104.3 kg)   BMI 36.57 kg/m²  Body mass index is 36.57 kg/m². Physical Examination:     Orthopedics:    GENERAL: Alert and oriented X3 in no acute distress. SKIN: Intact without lesions or ulcerations. Incision are well healed with no signs of infection. NEURO: Intact to sensory and motor testing. VASC: Capillary refill is less than 3 seconds. Large varicosities noted in the BLE. Post Op Exam:    LOCATION: Bilateral Knee  SITE: Distal neurocirculatory status is intact. EXAM: Sensation is intact to light touch, there is full motor function of the extremity. GAIT: The patient's gait was observed while entering the exam room and was noted to be non antalgic. The extremity is in anatomic alignment. SKIN: Intact without rashes, lesions, or ulcerations. No obvious deformity or swelling. NEURO: The patient responds to light touch throughout bilateral LE. Patellar and Achilles reflexes are 2/4. VASC: The bilateral LE is neurovascularly intact with 2/4 DP and 2/4 PT pulses. Brisk capillary refill. ROM: 0/127 degrees. There is no effusion. MUSC: good quad tone  LIGAMENT: There is No varus instability at 0 degrees and No varus instability at 30 degrees. There is No valgus instability at 0 degrees and No valgus instability at 30 degrees. PALP: There is no joint line pain. Pes anserine bursa pain to palpation. Procedures:     Procedure: yes    Pes Anserine Bursa Knee Injection    Location: Bilateral Knee  Procedure: Corticosteroid injection into the pes anserine bursa of the knee. The patient was placed in the Supine position on the exam table. The most tender point of the bursa was identified and marked. The skin was prepped with betadine in a sterile fashion.  Utilizing ultrasound for precise placement and clean technique with sterile gloves a 5cc solution containing 4cc of 1.0% Lidocaine with 1cc containing 40mg of Depo-medrol was treatment alternatives including anti-inflammatory medications, physical therapy, injections, further imaging studies and as a last resort surgery. During today's visit, I explained to the patient that since she is having pain then we will inject the pes anserine bursa again to help her get good pain relief when she is ambulating over the winter months. The patient then stated that she understands the plan and at this time, she has opted for a cortisone injection into the pes anserine bursa of the bilateral knee to help reduce inflammation and pain. The injection site should never get red, hot, or swollen and if it does the patient will contact our office right away. The patient may experience a increase in soreness the first 24-48 hours due to a cortisone flair and can take anti-inflammatories for a short period of time to reduce that soreness. The patient should not submerge the injection site in water for a minimum of 24 hours to avoid infection. This means no lakes, pools, ponds, or hot tubs for 24 hours. If the patient is diabetic the injection may increase their blood sugar for up to one week. The patient can do this cortisone injection once every 3 months as needed. If the injections stop working and do not give the patient relief the patient should consider surgical interventions to produce long term relief. Patient should return to the office PRN. The patient will call the office immediately with any problems that may arise. Orders Placed This Encounter   Medications    lidocaine 1 % injection 8 mL    methylPREDNISolone acetate (DEPO-MEDROL) injection 40 mg    methylPREDNISolone acetate (DEPO-MEDROL) injection 40 mg     No orders of the defined types were placed in this encounter. Robles García V, am scribing for and in the presence of Rufino Winslow PA-C. 10/30/2020  12:59 PM      IRufino PA-C, have personally seen this patient, reviewed the chart including history, and imaging if done.  I personally  performed the physical exam and obtained any needed additional history. I placed orders, performed or supervised procedures and developed the treatment plan.     Electronically signed by Cata Faith PA-C, on 10/30/2020 at 12:59 PM      Electronically signed by Cata Faith PA-C, on 10/30/2020 at 12:59 PM

## 2021-11-10 DIAGNOSIS — Z96.653 HISTORY OF TOTAL BILATERAL KNEE REPLACEMENT: Primary | ICD-10-CM

## 2021-12-22 ENCOUNTER — OFFICE VISIT (OUTPATIENT)
Dept: ORTHOPEDIC SURGERY | Age: 66
End: 2021-12-22
Payer: COMMERCIAL

## 2021-12-22 VITALS
SYSTOLIC BLOOD PRESSURE: 165 MMHG | RESPIRATION RATE: 15 BRPM | HEIGHT: 67 IN | WEIGHT: 230 LBS | HEART RATE: 84 BPM | BODY MASS INDEX: 36.1 KG/M2 | DIASTOLIC BLOOD PRESSURE: 98 MMHG

## 2021-12-22 DIAGNOSIS — Z96.653 HISTORY OF TOTAL BILATERAL KNEE REPLACEMENT: Primary | ICD-10-CM

## 2021-12-22 DIAGNOSIS — M70.50 PES ANSERINE BURSITIS: ICD-10-CM

## 2021-12-22 PROCEDURE — G8427 DOCREV CUR MEDS BY ELIG CLIN: HCPCS | Performed by: PHYSICIAN ASSISTANT

## 2021-12-22 PROCEDURE — 99213 OFFICE O/P EST LOW 20 MIN: CPT | Performed by: PHYSICIAN ASSISTANT

## 2021-12-22 PROCEDURE — 1036F TOBACCO NON-USER: CPT | Performed by: PHYSICIAN ASSISTANT

## 2021-12-22 PROCEDURE — 3017F COLORECTAL CA SCREEN DOC REV: CPT | Performed by: PHYSICIAN ASSISTANT

## 2021-12-22 PROCEDURE — G8484 FLU IMMUNIZE NO ADMIN: HCPCS | Performed by: PHYSICIAN ASSISTANT

## 2021-12-22 PROCEDURE — 20611 DRAIN/INJ JOINT/BURSA W/US: CPT | Performed by: PHYSICIAN ASSISTANT

## 2021-12-22 PROCEDURE — G8417 CALC BMI ABV UP PARAM F/U: HCPCS | Performed by: PHYSICIAN ASSISTANT

## 2021-12-22 RX ORDER — METHYLPREDNISOLONE ACETATE 40 MG/ML
80 INJECTION, SUSPENSION INTRA-ARTICULAR; INTRALESIONAL; INTRAMUSCULAR; SOFT TISSUE ONCE
Status: COMPLETED | OUTPATIENT
Start: 2021-12-22 | End: 2021-12-22

## 2021-12-22 RX ORDER — BUPIVACAINE HYDROCHLORIDE 5 MG/ML
4 INJECTION, SOLUTION PERINEURAL ONCE
Status: COMPLETED | OUTPATIENT
Start: 2021-12-22 | End: 2021-12-22

## 2021-12-22 RX ADMIN — METHYLPREDNISOLONE ACETATE 80 MG: 40 INJECTION, SUSPENSION INTRA-ARTICULAR; INTRALESIONAL; INTRAMUSCULAR; SOFT TISSUE at 16:13

## 2021-12-22 RX ADMIN — BUPIVACAINE HYDROCHLORIDE 20 MG: 5 INJECTION, SOLUTION PERINEURAL at 16:12

## 2021-12-22 ASSESSMENT — ENCOUNTER SYMPTOMS
CHEST TIGHTNESS: 0
COUGH: 0
CONSTIPATION: 0
DIARRHEA: 0
ABDOMINAL PAIN: 0
RESPIRATORY NEGATIVE: 1
APNEA: 0
SHORTNESS OF BREATH: 0
ABDOMINAL DISTENTION: 0
NAUSEA: 0
VOMITING: 0
COLOR CHANGE: 0

## 2021-12-22 NOTE — PROGRESS NOTES
[facility]  [address] [place] [id number]  Dept: [phone]  Dept Fax: [phone]        Post Operative Follow Up    Subjective:     Chief Complaint   Patient presents with    Follow-up     B Knee Inj Req (LI 10/30/20), Hx of B [facility] 7/2/2016)     Post Op Surgery:     The patient is here for a follow up after having a lateral total knee replacements. Date of surgery was 7/2/2016. She has had pain in the front of her knees now and previously in the past.  We have done Pes anserine bursa injections into both knees on 10/30/2020 with good relief. The pain in the anterior knee began hurting again approximately 2 months ago. Patient states they are happy with her knee replacements. She does have some knee pain on occasion. After the injections it usually feels better. Review of Systems   Constitutional: Positive for activity change. Negative for appetite change, fatigue and fever. Respiratory: Negative. Negative for apnea, cough, chest tightness and shortness of breath. Cardiovascular: Negative. Negative for chest pain, palpitations and leg swelling. Gastrointestinal: Negative for abdominal distention, abdominal pain, constipation, diarrhea, nausea and vomiting. Genitourinary: Negative for difficulty urinating, dysuria and hematuria. Musculoskeletal: Positive for arthralgias and gait problem. Negative for joint swelling and myalgias. Skin: Negative for color change and rash. Neurological: Negative for dizziness, weakness, numbness and headaches. Psychiatric/Behavioral: Positive for sleep disturbance. I have reviewed the CC, HPI, ROS, PMH, FHX, Social History, and if not present in this note, I have reviewed in the patient's chart. I agree with the documentation provided by other staff and have reviewed their documentation prior to providing my signature indicating agreement.   Vitals:   BP (!) 165/98   Pulse 84   Resp 15   Ht 5' 6.5\" (1.689 m)   Wt 230 lb (104.3 kg)   BMI 36.57 kg/m²  Body mass index is 36.57 kg/m². Physical Examination:     Orthopedics:    GENERAL: Alert and oriented X3 in no acute distress. SKIN: Intact without lesions or ulcerations. Incision is well healed with no signs of infection. NEURO: Intact to sensory and motor testing. VASC: Capillary refill is less than 3 seconds. Post Op Exam:     LOCATION: Bilateral Knee  SITE: Distal neurocirculatory status is intact. EXAM: Sensation is intact to light touch, there is full motor function of the extremity. GAIT: The patient's gait was observed while entering the exam room and was noted to be non antalgic. The extremity is in anatomic alignment. SKIN: Intact without rashes, lesions, or ulcerations. No obvious deformity or swelling. NEURO: The patient responds to light touch throughout bilateral LE. Patellar and Achilles reflexes are 2/4. VASC: The bilateral LE is neurovascularly intact with 2/4 DP and 2/4 PT pulses. Brisk capillary refill. ROM: 0/127 degrees. There is mild effusion on the right. MUSC: good quad tone  LIGAMENT: There is No varus instability at 1+ degrees and 1+ varus instability at 30 degrees. There is No valgus instability at 0 degrees and No valgus instability at 30 degrees. PALP: There is no joint line pain. Bilateral Pes anserine bursa pain to palpation. Procedure:     Pes Anserine Bursa Knee Injection    Location: Bilateral Knee  Procedure: I discussed in detail the risks, benefits and complications of an injection which included but are not limited to infection, skin reactions, hot swollen joint and anaphylaxis with the patient. The patient verbalized understanding and gave informed consent for the injection. The corticosteroid injection will be placed into the pes anserine bursa of the Bilateral knee. The patient was placed in the Supine position on the exam table.  The most tender point of the bursa was identified and marked with the hollow tip of a ball point pen. The skin was prepped with betadine in a sterile fashion. Utilizing a Zscaler ultrasound unit with a variable frequency linear transducer for precise placement, a 22 gauge needle into the pes anserine bursa of the Bilateral knee and clean technique with sterile gloves a 4 cc solution containing 2 cc of 0.5% bupivacaine  with 2cc containing 40mg of Depo-medrol was injected. There was no resistance to the injection and the patient tolerated the injection well. The wound was cleansed and a band-aid was placed over the injection site. Adverse reactions to the injection were discussed with the patient including signs of infection, increasing pain, redness, warmth, fever, chills or swelling and the patient was instructed to call immediately if they are experiencing any of these symptoms. Images of the injection site were recorded throughout the procedure and are saved on the SD card which is stored in the Zscaler ultrasound unit. All images were downloaded and stored in patient's chart for permanent record. Radiology:   XR KNEE RIGHT (1-2 VIEWS)    Result Date: 12/22/2021  KNEE - TKA X-RAY 4 views of the right knee including AP, bilateral tunnel, and lateral in the upright position, and skyline views reveal anatomic alignment with no fracture or dislocation. There is a right [employer] total knee replacement in good alignment. The implants are well seated with no signs of cement mantle loosening. There is no signs of osteolysis or wear. There is no acute bony abnormality, periosteal reaction or soft tissue masses present. No changes since previous x-ray on 10/30/2020. Impression: Stable [employer] knee placement of the right knee.      XR KNEE LEFT (MIN 4 VIEWS)    Result Date: 12/22/2021  KNEE - TKA X-RAY 4 views of the left knee including AP, bilateral tunnel, and lateral in the upright position, and skyline views reveal anatomic alignment week. The patient can do this cortisone injection once every 3 months as needed. If the injections stop working and do not give the patient relief the patient should consider surgical interventions to produce long term relief. We did discuss antibiotics for dental prophylaxis. She states she has not been in a while and she will call when she gets her appointment. Patient should return to the office in 1 years to f/u with me. The patient will call the office immediately with any problems that may arise. Orders Placed This Encounter   Medications    bupivacaine (MARCAINE) 0.5 % injection 20 mg    methylPREDNISolone acetate (DEPO-MEDROL) injection 80 mg    methylPREDNISolone acetate (DEPO-MEDROL) injection 80 mg       No orders of the defined types were placed in this encounter.         Electronically signed by Yee Kelly PA-C, on 12/23/2021 at 9:05 AM